# Patient Record
Sex: MALE | Race: WHITE
[De-identification: names, ages, dates, MRNs, and addresses within clinical notes are randomized per-mention and may not be internally consistent; named-entity substitution may affect disease eponyms.]

---

## 2018-05-22 ENCOUNTER — HOSPITAL ENCOUNTER (EMERGENCY)
Dept: HOSPITAL 76 - ED | Age: 73
Discharge: LEFT BEFORE DISCHARGE | End: 2018-05-22
Payer: MEDICARE

## 2018-05-22 DIAGNOSIS — Z53.21: Primary | ICD-10-CM

## 2019-09-21 ENCOUNTER — HOSPITAL ENCOUNTER (EMERGENCY)
Dept: HOSPITAL 76 - ED | Age: 74
Discharge: HOME | End: 2019-09-21
Payer: MEDICARE

## 2019-09-21 VITALS — DIASTOLIC BLOOD PRESSURE: 79 MMHG | SYSTOLIC BLOOD PRESSURE: 121 MMHG
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
## 2019-09-21 DIAGNOSIS — S51.811A: Primary | ICD-10-CM

## 2019-09-21 DIAGNOSIS — Z79.82: ICD-10-CM
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
## 2019-09-21 PROCEDURE — 99283 EMERGENCY DEPT VISIT LOW MDM: CPT

## 2019-09-21 PROCEDURE — 90471 IMMUNIZATION ADMIN: CPT

## 2019-09-21 PROCEDURE — 99282 EMERGENCY DEPT VISIT SF MDM: CPT

## 2019-09-21 PROCEDURE — 90715 TDAP VACCINE 7 YRS/> IM: CPT

## 2019-09-21 NOTE — ED PHYSICIAN DOCUMENTATION
PD HPI UPPER EXT INJURY





- Stated complaint


Stated Complaint: RT ARM INJ





- Chief complaint


Chief Complaint: Laceration





- History obtained from


History obtained from: Patient





- History of Present Illness


Location: Right, Forearm


Type of injury: Laceration


Where injury occurred: Home


Timing - onset: How many hours ago (8)


Timing - details: Abrupt onset


Pain level now: 0


Improved by: Dressing


Associated symptoms: No: Weakness, Numbness, Tingling, Swelling, Discolored


Contributing factors: Anticoagulated (Takes a regular aspirin every other day)


Recently seen: Not recently seen





- Additonal information


Additional information: 





This is a 74-year-old who was using a chainsaw earlier in the day today when he 

just barely neck the back of his right forearm.  He put a bandage on it and 

finished working out the day and went home to take a shower and it opened up 

again and he could get the bleeding controlled.  He says there is no pain.  T

here is no limitation in movement of his fingers, no numbness or tingling down 

into his fingers.  He takes a regular aspirin every other day.  He is unknown 

when his last tetanus vaccine was.





Review of Systems


Skin: reports: Laceration (s)


Musculoskeletal: denies: Extremity pain, Joint pain, Extremity swelling


Neurologic: denies: Numbness





PD PAST MEDICAL HISTORY





- Allergies


Allergies/Adverse Reactions: 


                                    Allergies











Allergy/AdvReac Type Severity Reaction Status Date / Time


 


No Known Drug Allergies Allergy   Verified 09/21/19 17:41














- Immunizations


Immunizations: TDAP >10years/unknown





PD ED PE NORMAL





- Vitals


Vital signs reviewed: Yes





- General


General: Alert and oriented X 3, No acute distress, Well developed/nourished





- HEENT


HEENT: Atraumatic





- Cardiac


Cardiac: RRR





- Respiratory


Respiratory: No respiratory distress





- Derm


Derm: Other (1-1/2 to 2 cm laceration on the radial dorsal aspect of the right 

forearm that is superficial except for about 2 mm that is deep to epidermis and 

has clot overlying it.)





- Extremities


Extremities: No tenderness to palpate





- Neuro


Neuro: Alert and oriented X 3, No motor deficit, No sensory deficit, Normal 

speech





Results





- Vitals


Vitals: 


                               Vital Signs - 24 hr











  09/21/19





  17:41


 


Temperature 36.5 C


 


Heart Rate 80


 


O2 Saturation 97








                                     Oxygen











O2 Source                      Room air

















PD MEDICAL DECISION MAKING





- ED course


Complexity details: d/w patient


ED course: 





Most of this laceration is superficial in the epidermis except for very small 

portion it probably nicked a small underlying vein in there.  It is actively 

clotted at this time without active bleeding.  I do not think closing with 

sutures would really provide much at this point.  This was discussed with the 

patient and he does not want it sutured anyway.He should leave our bandage on 

for 24 to 36 hours.  Be very careful with cleansing.  Hold direct pressure if it

continues bleeding.  He is updated on his tetanus vaccine.





Departure





- Departure


Disposition: 01 Home, Self Care


Clinical Impression: 


 Abrasion





Instructions:  ED Abrasion


Comments: 


Keep our bandage on 24 to 36 hours.  After removal of the bandage cleanse gently

with soap and water.  Hold direct pressure if it begins bleeding.  Hold your 

aspirin for the next couple of doses.  Return if any signs of infection to 

include spreading redness, fever, increasing pain or purulent drainage.